# Patient Record
(demographics unavailable — no encounter records)

---

## 2025-05-02 NOTE — CONSULT LETTER
[FreeTextEntry1] : Dear Dr. MOREIRA,  I had the pleasure of seeing KIMMIE BAUM for follow-up today.  Below is my note regarding the office visit today.  Thank you so very much for allowing me to participate in KIMMIE's  care.  Please don't hesitate to call me should any questions or issues arise .  Sincerely,   Jasiel Albrecht MD, FACS, FSPU Chief, Pediatric Urology Professor of Urology and Pediatrics [employer]  President, [employer] - [place] Section Past-President, Societies for Pediatric Urology

## 2025-05-02 NOTE — ASSESSMENT
[FreeTextEntry1] : Thelma has stable   left grade 2 hydronephrosis that is not concerning for an obstruction and is not due to reflux based on the VCUG. I recommend another ultrasound in   18 months for surveillance of the hydronephrosis. Follow-up sooner for any urologic issues or concerns. All questions were answered.

## 2025-05-02 NOTE — DATA REVIEWED
[FreeTextEntry1] : EXAMINATION: US RENAL AND PELVIS TODAY IN OFFICE  FINDINGS:  GRADE     HYDRONEPHROSIS OTHERWISE UNREMARKABLE KIDNEY AND PELVIC STRUCTURES. no

## 2025-05-02 NOTE — HISTORY OF PRESENT ILLNESS
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  A renal ultrasound at birth demonstrated moderate left hydronephrosis (my review grade 2). USVCUG (Feb 2022) demonstrated 1. No vesicoureteral reflux 2. Mild left hydronephrosis, improved from prior study.   Most recent in office ultrasounds (Nov 2023) demonstrated stable left grade 2 hydronephrosis. Returns today for repeat in office ultrasounds. Since the last visit, she has been well without any UTIs, unexplained fevers, voiding complaints, issues feeding.

## 2025-05-02 NOTE — REASON FOR VISIT
[Follow-Up Visit] : a follow-up visit [Hydronephrosis] : hydronephrosis [Parents] : parents [TextBox_50] : ultrasounds

## 2025-05-02 NOTE — CONSULT LETTER
[FreeTextEntry1] : Dear Dr. MOREIRA,  I had the pleasure of seeing KIMMIE BAUM for follow-up today.  Below is my note regarding the office visit today.  Thank you so very much for allowing me to participate in KIMMIE's  care.  Please don't hesitate to call me should any questions or issues arise .  Sincerely,   Jasiel Albrecht MD, FACS, FSPU Chief, Pediatric Urology Professor of Urology and Pediatrics [employer]  President, [employer] - [place] Section Past-President, Societies for Pediatric U[employer]

## 2025-05-28 NOTE — CONSULT LETTER
[FreeTextEntry1] : Dear Dr. MOREIRA,  I had the pleasure of seeing KIMMIE BAUM for follow-up today.  Below is my note regarding the office visit today.  Thank you so very much for allowing me to participate in KIMMIE's  care.  Please don't hesitate to call me should any questions or issues arise .  Sincerely,   Jasiel Albrecht MD, FACS, FSPU Chief, Pediatric Urology Professor of Urology and Pediatrics [facility] [facility] [employer]  President, [employer] - [place] Section Past-President, Societies for Pediatric Urology

## 2025-05-28 NOTE — DATA REVIEWED
[FreeTextEntry1] : EXAMINATION: US RENAL AND PELVIS TODAY IN OFFICE  FINDINGS: LEFT GRADE 1-2 HYDRONEPHROSIS OTHERWISE UNREMARKABLE KIDNEY AND PELVIC STRUCTURES.

## 2025-05-28 NOTE — CONSULT LETTER
[FreeTextEntry1] : Dear Dr. MOREIRA,  I had the pleasure of seeing KIMMIE BAUM for follow-up today.  Below is my note regarding the office visit today.  Thank you so very much for allowing me to participate in KIMMIE's  care.  Please don't hesitate to call me should any questions or issues arise .  Sincerely,   Jasiel Albrecht MD, FACS, FSPU Chief, Pediatric Urology Professor of Urology and Pediatrics [facility] [facility] [employer]  President, [employer] - [place] Section Past-President, Societies for Pediatric Urology

## 2025-05-28 NOTE — ASSESSMENT
[FreeTextEntry1] : Thelma has improving left grade 2 hydronephrosis that is not concerning for an obstruction and is not due to reflux based on the VCUG. I recommend another ultrasound in 24 months for surveillance of the hydronephrosis. Follow-up sooner for any urologic issues or concerns. All questions were answered.